# Patient Record
Sex: MALE | Race: OTHER | HISPANIC OR LATINO
[De-identification: names, ages, dates, MRNs, and addresses within clinical notes are randomized per-mention and may not be internally consistent; named-entity substitution may affect disease eponyms.]

---

## 2022-05-13 PROBLEM — Z00.00 ENCOUNTER FOR PREVENTIVE HEALTH EXAMINATION: Status: ACTIVE | Noted: 2022-05-13

## 2022-05-16 ENCOUNTER — NON-APPOINTMENT (OUTPATIENT)
Age: 47
End: 2022-05-16

## 2022-05-16 ENCOUNTER — APPOINTMENT (OUTPATIENT)
Dept: HEART AND VASCULAR | Facility: CLINIC | Age: 47
End: 2022-05-16
Payer: MEDICAID

## 2022-05-16 VITALS
HEART RATE: 66 BPM | OXYGEN SATURATION: 98 % | HEIGHT: 66 IN | SYSTOLIC BLOOD PRESSURE: 110 MMHG | TEMPERATURE: 97.8 F | DIASTOLIC BLOOD PRESSURE: 81 MMHG | BODY MASS INDEX: 36.32 KG/M2 | WEIGHT: 226 LBS

## 2022-05-16 DIAGNOSIS — R07.9 CHEST PAIN, UNSPECIFIED: ICD-10-CM

## 2022-05-16 PROCEDURE — 99204 OFFICE O/P NEW MOD 45 MIN: CPT

## 2022-05-16 PROCEDURE — 93000 ELECTROCARDIOGRAM COMPLETE: CPT

## 2022-05-16 RX ORDER — PANTOPRAZOLE 20 MG/1
20 TABLET, DELAYED RELEASE ORAL DAILY
Qty: 30 | Refills: 3 | Status: ACTIVE | COMMUNITY
Start: 2022-05-16 | End: 1900-01-01

## 2022-05-31 ENCOUNTER — NON-APPOINTMENT (OUTPATIENT)
Age: 47
End: 2022-05-31

## 2022-05-31 NOTE — PHYSICAL EXAM
[No Acute Distress] : no acute distress [Normal S1, S2] : normal S1, S2 [No Murmur] : no murmur [No Rub] : no rub [No Gallop] : no gallop [Clear Lung Fields] : clear lung fields [Good Air Entry] : good air entry [Soft] : abdomen soft [Non Tender] : non-tender [No Edema] : no edema

## 2022-05-31 NOTE — REASON FOR VISIT
[FreeTextEntry1] : Mr. Howard is a 46 year old man with no significant PMH who presents with chest pain for the last 12 months. Patient developed chest pain while doing job and driving, relieved by resting, drinking water and hitting chest. 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 Occasionally chest pain also occurs at rest, with radiation of pressure to left arm, associated with dyspnea. Last occurrence on Friday prior to visit today, was seen at [facility], labs below. Denies syncope, lightheadedness, palpitations, orthopnea, PND or REMY. Does have occasional reflux. \par \par SH\par Drinks 2 to 3 beers a week\par Never smoker\par \par \par

## 2022-05-31 NOTE — ASSESSMENT
[FreeTextEntry1] : 6 year old man with no significant PMH who presents with predominantly exertional chest pain for the past year associated with dyspnea. Symptoms concerning for ischemia, also considering GERD given some atypical features. EKG from OSH showed sinus rhythm with sinus \par - TTE, stress test\par - Labs recently performed at Urgent Care reviewed\par - Trial of PPI prescribed\par \par \par \par Labs from OSH\par \par HDL 70\par TG 70\par \par TSH wnl\par A1c 5.5\par CBC and BMP wnl\par Troponin neg 5/5\par 2022 BNP <10 5/5

## 2022-06-23 ENCOUNTER — FORM ENCOUNTER (OUTPATIENT)
Age: 47
End: 2022-06-23

## 2022-06-24 ENCOUNTER — OUTPATIENT (OUTPATIENT)
Dept: OUTPATIENT SERVICES | Facility: HOSPITAL | Age: 47
LOS: 1 days | End: 2022-06-24
Payer: COMMERCIAL

## 2022-06-24 DIAGNOSIS — R07.9 CHEST PAIN, UNSPECIFIED: ICD-10-CM

## 2022-06-24 PROCEDURE — 93017 CV STRESS TEST TRACING ONLY: CPT

## 2022-06-24 PROCEDURE — C8929: CPT

## 2022-06-24 PROCEDURE — 93306 TTE W/DOPPLER COMPLETE: CPT | Mod: 26
